# Patient Record
Sex: MALE | Race: WHITE | Employment: UNEMPLOYED | URBAN - METROPOLITAN AREA
[De-identification: names, ages, dates, MRNs, and addresses within clinical notes are randomized per-mention and may not be internally consistent; named-entity substitution may affect disease eponyms.]

---

## 2021-12-17 ENCOUNTER — HOSPITAL ENCOUNTER (EMERGENCY)
Age: 31
Discharge: HOME | End: 2021-12-18
Attending: STUDENT IN AN ORGANIZED HEALTH CARE EDUCATION/TRAINING PROGRAM
Payer: MEDICAID

## 2021-12-17 DIAGNOSIS — E87.6 HYPOKALEMIA: ICD-10-CM

## 2021-12-17 DIAGNOSIS — F19.10 POLYSUBSTANCE ABUSE (HCC): ICD-10-CM

## 2021-12-17 DIAGNOSIS — T40.601A OPIATE OVERDOSE, ACCIDENTAL OR UNINTENTIONAL, INITIAL ENCOUNTER (HCC): Primary | ICD-10-CM

## 2021-12-17 LAB
ANION GAP SERPL CALC-SCNC: 7 MMOL/L (ref 3–18)
BASOPHILS # BLD: 0 K/UL (ref 0–0.1)
BASOPHILS NFR BLD: 0 % (ref 0–2)
BUN SERPL-MCNC: 15 MG/DL (ref 7–18)
BUN/CREAT SERPL: 13 (ref 12–20)
CALCIUM SERPL-MCNC: 8.4 MG/DL (ref 8.5–10.1)
CHLORIDE SERPL-SCNC: 107 MMOL/L (ref 100–111)
CO2 SERPL-SCNC: 26 MMOL/L (ref 21–32)
CREAT SERPL-MCNC: 1.13 MG/DL (ref 0.6–1.3)
DIFFERENTIAL METHOD BLD: NORMAL
EOSINOPHIL # BLD: 0.2 K/UL (ref 0–0.4)
EOSINOPHIL NFR BLD: 3 % (ref 0–5)
ERYTHROCYTE [DISTWIDTH] IN BLOOD BY AUTOMATED COUNT: 13.5 % (ref 11.6–14.5)
GLUCOSE SERPL-MCNC: 185 MG/DL (ref 74–99)
HCT VFR BLD AUTO: 42.3 % (ref 36–48)
HGB BLD-MCNC: 14.6 G/DL (ref 13–16)
IMM GRANULOCYTES # BLD AUTO: 0 K/UL (ref 0–0.04)
IMM GRANULOCYTES NFR BLD AUTO: 0 % (ref 0–0.5)
LYMPHOCYTES # BLD: 3 K/UL (ref 0.9–3.6)
LYMPHOCYTES NFR BLD: 39 % (ref 21–52)
MAGNESIUM SERPL-MCNC: 1.9 MG/DL (ref 1.6–2.6)
MCH RBC QN AUTO: 31.2 PG (ref 24–34)
MCHC RBC AUTO-ENTMCNC: 34.5 G/DL (ref 31–37)
MCV RBC AUTO: 90.4 FL (ref 78–100)
MONOCYTES # BLD: 0.5 K/UL (ref 0.05–1.2)
MONOCYTES NFR BLD: 7 % (ref 3–10)
NEUTS SEG # BLD: 3.9 K/UL (ref 1.8–8)
NEUTS SEG NFR BLD: 51 % (ref 40–73)
NRBC # BLD: 0 K/UL (ref 0–0.01)
NRBC BLD-RTO: 0 PER 100 WBC
PLATELET # BLD AUTO: 212 K/UL (ref 135–420)
PMV BLD AUTO: 9.7 FL (ref 9.2–11.8)
POTASSIUM SERPL-SCNC: 3 MMOL/L (ref 3.5–5.5)
RBC # BLD AUTO: 4.68 M/UL (ref 4.35–5.65)
SODIUM SERPL-SCNC: 140 MMOL/L (ref 136–145)
WBC # BLD AUTO: 7.7 K/UL (ref 4.6–13.2)

## 2021-12-17 PROCEDURE — 99285 EMERGENCY DEPT VISIT HI MDM: CPT

## 2021-12-17 PROCEDURE — 74011250636 HC RX REV CODE- 250/636

## 2021-12-17 PROCEDURE — 74011250637 HC RX REV CODE- 250/637: Performed by: STUDENT IN AN ORGANIZED HEALTH CARE EDUCATION/TRAINING PROGRAM

## 2021-12-17 PROCEDURE — 80048 BASIC METABOLIC PNL TOTAL CA: CPT

## 2021-12-17 PROCEDURE — 96361 HYDRATE IV INFUSION ADD-ON: CPT

## 2021-12-17 PROCEDURE — 96374 THER/PROPH/DIAG INJ IV PUSH: CPT

## 2021-12-17 PROCEDURE — 83735 ASSAY OF MAGNESIUM: CPT

## 2021-12-17 PROCEDURE — 74011250636 HC RX REV CODE- 250/636: Performed by: STUDENT IN AN ORGANIZED HEALTH CARE EDUCATION/TRAINING PROGRAM

## 2021-12-17 PROCEDURE — 93005 ELECTROCARDIOGRAM TRACING: CPT

## 2021-12-17 PROCEDURE — 85025 COMPLETE CBC W/AUTO DIFF WBC: CPT

## 2021-12-17 RX ORDER — NALOXONE HYDROCHLORIDE 1 MG/ML
INJECTION INTRAMUSCULAR; INTRAVENOUS; SUBCUTANEOUS
Status: COMPLETED
Start: 2021-12-17 | End: 2021-12-17

## 2021-12-17 RX ORDER — NALOXONE HYDROCHLORIDE 1 MG/ML
2 INJECTION INTRAMUSCULAR; INTRAVENOUS; SUBCUTANEOUS ONCE
Status: COMPLETED | OUTPATIENT
Start: 2021-12-17 | End: 2021-12-17

## 2021-12-17 RX ORDER — POTASSIUM CHLORIDE 20 MEQ/1
40 TABLET, EXTENDED RELEASE ORAL
Status: COMPLETED | OUTPATIENT
Start: 2021-12-17 | End: 2021-12-17

## 2021-12-17 RX ADMIN — POTASSIUM CHLORIDE 40 MEQ: 1500 TABLET, EXTENDED RELEASE ORAL at 23:47

## 2021-12-17 RX ADMIN — NALOXONE HYDROCHLORIDE 2 MG: 1 INJECTION PARENTERAL at 22:42

## 2021-12-17 RX ADMIN — SODIUM CHLORIDE 1000 ML: 9 INJECTION, SOLUTION INTRAVENOUS at 23:00

## 2021-12-17 RX ADMIN — NALOXONE HYDROCHLORIDE 2 MG: 1 INJECTION INTRAMUSCULAR; INTRAVENOUS; SUBCUTANEOUS at 22:42

## 2021-12-18 VITALS
HEIGHT: 68 IN | WEIGHT: 140 LBS | DIASTOLIC BLOOD PRESSURE: 64 MMHG | HEART RATE: 69 BPM | BODY MASS INDEX: 21.22 KG/M2 | TEMPERATURE: 97 F | OXYGEN SATURATION: 95 % | SYSTOLIC BLOOD PRESSURE: 109 MMHG | RESPIRATION RATE: 11 BRPM

## 2021-12-18 RX ORDER — POTASSIUM CHLORIDE 20 MEQ/1
20 TABLET, EXTENDED RELEASE ORAL 3 TIMES DAILY
Qty: 9 TABLET | Refills: 0 | Status: SHIPPED | OUTPATIENT
Start: 2021-12-18 | End: 2021-12-21

## 2021-12-18 RX ORDER — NALOXONE HYDROCHLORIDE 4 MG/.1ML
SPRAY NASAL
Qty: 1 EACH | Refills: 0 | Status: SHIPPED | OUTPATIENT
Start: 2021-12-18

## 2021-12-18 NOTE — ED NOTES
Pt fell asleep, respiratory rate dropped and oxygen saturation dropped to mid 70's. Roused pt and sat quickly came up to 94%. Dr Branden Bello notified of event. Pt medicated as ordered.

## 2021-12-18 NOTE — ED NOTES
Report per EMS they were called for pt having sob. On their arrival pt cyanotic, snoring respirations with pulse ox of 40%. They began giving assisted breaths per ambu bag and gave him 2mg of Narcan. Pt became alert, pink and breathing on his own. On arrival pt alert, respirations unlabored and even. Pt admits to snorting an unknown substance.

## 2021-12-18 NOTE — DISCHARGE INSTRUCTIONS
Please carefully read all discharge instructions    Please follow-up with a primary care physician and if you do not have one currently use the contact information provided to obtain an appointment. If none was provided please call the number on the back of your insurance card to locate a Primary care doctor. Many offices have \"cancellation lists\" that you can ask to be placed on; should a patient with an earlier appointment cancel you will be notified to take their place. Please return to the Emergency Room immediately if your symptoms worsen. Please return to the Emergency Department if you develop a fever, chills, cannot eat or drink due to nausea or vomiting, or if any of your symptoms worsen. If you do not have insurance you can use the below for your medications. [email or web address]    What are GoodRx coupons? GoodRx coupons will help you pay less than the cash price for your prescription. Bettie Montano free to use and are accepted at virtually every [place] pharmacy. Your pharmacist will know how to enter the codes on the coupon to pull up the lowest discount available.

## 2021-12-19 LAB
ATRIAL RATE: 83 BPM
CALCULATED P AXIS, ECG09: 66 DEGREES
CALCULATED R AXIS, ECG10: 88 DEGREES
CALCULATED T AXIS, ECG11: 45 DEGREES
DIAGNOSIS, 93000: NORMAL
P-R INTERVAL, ECG05: 148 MS
Q-T INTERVAL, ECG07: 362 MS
QRS DURATION, ECG06: 112 MS
QTC CALCULATION (BEZET), ECG08: 425 MS
VENTRICULAR RATE, ECG03: 83 BPM

## 2021-12-19 NOTE — ED PROVIDER NOTES
EMERGENCY DEPARTMENT HISTORY AND PHYSICAL EXAM      Date: 12/17/2021  Patient Name: Chung Sims    History of Presenting Illness     Chief Complaint   Patient presents with    Shortness of Breath       History (Context): Chung Sims is a 27 y.o. male who presents with AMS by EMS. EMS was dispatched for a shortness of breath call, on arrival patient was cyanotic, snoring respirations, saturation of 40% initially they assisted ventilations with Ambu bag and gave him 2 mg of Narcan. Initially on arrival, patient was alert and oriented x4, admitted to inhalation of an unknown substance. A period of time after arrival he fell asleep, his oxygen saturation dropped to the mid 70s on room air, his respiration rate decreased, we arouse patient, he sat up and his O2 saturation came up to 94%. At this time I will give him prophylactic Narcan, observe. He stated that he normally smokes marijuana and some other inhaling drugs, vaping, but tonight he tried snorting heroin. PCP: None    Current Outpatient Medications   Medication Sig Dispense Refill    naloxone (Narcan) 4 mg/actuation nasal spray Use 1 spray intranasally, then discard. Repeat with new spray every 2 min as needed for opioid overdose symptoms, alternating nostrils. 1 Each 0    potassium chloride (K-DUR, KLOR-CON M20) 20 mEq tablet Take 1 Tablet by mouth three (3) times daily for 3 days. 9 Tablet 0       Past History     Past Medical History:  Past Medical History:   Diagnosis Date    Hydrocephalus     [facility]umatic [facility])        Past Surgical History:  Past Surgical History:   Procedure Laterality Date    HX ORTHOPAEDIC      right knee    NEUROLOGICAL PROCEDURE UNLISTED      surgery for water on the brain       Family History:  No family history on file.     Social History:  Social History     Tobacco Use    Smoking status: Current Every Day Smoker     Packs/day: 1.50    Smokeless tobacco: Not on file   Substance Use Topics  Alcohol use: Yes     Comment: socially    Drug use: Not on file       Allergies:  No Known Allergies    PMH, PSH, family history, social history, allergies reviewed with the patient with significant items noted above. Review of Systems   Could not be reliably obtained due to mental status    Physical Exam     Vitals:    12/18/21 0132 12/18/21 0202 12/18/21 0230 12/18/21 0302   BP: 104/62 110/64 121/71 109/64   Pulse: 72 73 73 69   Resp: 12 13 11 11   Temp:       SpO2: 96% 95% 97% 95%   Weight:       Height:           Gen:ill-appearing, chronic, in no acute distress   HEENT: Normocephalic, sclera anicteric  Cardiovascular: Normal rate, regular rhythm, no murmurs, rubs, gallops. Pulses intact and equal distally. Pulmonary: No respiratory distress. No stridor. Clear lungs. ABD: Soft, nontender, nondistended. Neuro: Alert. speech. Altered mentation. PERRLA. Cranial nerves II through XII intact. Sensation and strength normal  Psych: Flat, not agitated  : No CVA tenderness  EXT: No rashes. Moves all extremities well. No cyanosis or clubbing. Skin: Warm and well-perfused. Diagnostic Study Results     Labs -   No results found for this or any previous visit (from the past 12 hour(s)). Radiologic Studies -   No orders to display     CT Results  (Last 48 hours)    None        CXR Results  (Last 48 hours)    None            Medical Decision Making   I am the first provider for this patient. I reviewed the vital signs, available nursing notes, past medical history, past surgical history, family history and social history. Vital Signs-Reviewed the patient's vital signs. EKG: Interpreted by myself.  EKG non diagnostic, without acute ischemic changes, arrhythmia, prolonged QT, or evidence of Brugada       Records Reviewed: Personally, on initial evaluation    MDM:   Patient presents with altered mental status, alert on arrival.  Exam significant for admission heroin use, response to Narcan. DDX considered: The differential is broad but includes toxic, metabolic, infectious, primary neurologic, endocrine, functional etiologies. Will screen basic labs, EKG, suspect this is acute heroin overdose, now resolved with Narcan. Critical Care  Performed by: Aleena Hirsch MD  Authorized by: Aleena Hirsch MD     Critical care provider statement:     Critical care time (minutes):  35    Critical care was necessary to treat or prevent imminent or life-threatening deterioration of the following conditions:  CNS failure or compromise, respiratory failure and toxidrome    Critical care was time spent personally by me on the following activities:  Evaluation of patient's response to treatment, examination of patient, interpretation of cardiac output measurements, ordering and review of radiographic studies, ordering and review of laboratory studies, ordering and performing treatments and interventions, pulse oximetry, re-evaluation of patient's condition and review of old charts          Plan:   Close Observation  Cardiac monitoring    Orders as below:  Orders Placed This Encounter    CBC WITH AUTOMATED DIFF    BASIC METABOLIC PANEL    MAGNESIUM    EKG, 12 LEAD, INITIAL    naloxone (NARCAN) injection 2 mg    naloxone (NARCAN) 1 mg/mL injection    sodium chloride 0.9 % bolus infusion 1,000 mL    potassium chloride (K-DUR, KLOR-CON M20) SR tablet 40 mEq    naloxone (Narcan) 4 mg/actuation nasal spray    potassium chloride (K-DUR, KLOR-CON M20) 20 mEq tablet        ED Course:   ED Course as of 12/20/21 1834   Fri Dec 17, 2021   2319 CBC without leukocytosis or anemia. [DM]   8384 Potassium(!): 3.0  Repleting with oral 40   Checking mag [DM]   3503 Due to a high probability of clinically significant, life threatening deterioration, the patient required a significant level of medical attention. I personally spent this critical care time directly and personally managing the patient. This critical care time included obtaining a history; examining the patient; pulse oximetry; ordering and review of studies; arranging urgent treatment with development of a management plan; evaluation of patient's response to treatment; frequent reassessment; and, discussions with other providers. Critical care time was performed to assess and manage the high probability of imminent, life-threatening deterioration that could result in multi-organ failure. It was exclusive of separately billable procedures and treating other patients and teaching time. Total time 35 mins       [DM]      ED Course User Index  [DM] Nando Ferrara MD      Had a long discussion with patient about his polysubstance abuse, will prescribe him Narcan for home, explained that heroin, unlike marijuana or vaping, can certainly depress his respiratory drive, and kill him if no one was around to call 911. The patient had a prolonged stay for extensive diagnostics, interventions, observation, serial exams and treatment that were necessary for the diagnosis, treatment and safety of the patient due to their illness. No acute pathology necessitating further emergent workup or hospital admission is suspected or found. Will discharge home with narcan. He is comfortable with the plan and discharge at this time. Expressed the importance of follow up for current symptoms and he agrees and was advised on what signs/symptoms to return immediately to the ER.            Vitals Review/addressed -     Diagnostic Study Results     Orders Placed This Encounter    CBC WITH AUTOMATED DIFF     Standing Status:   Standing     Number of Occurrences:   1    BASIC METABOLIC PANEL     Standing Status:   Standing     Number of Occurrences:   1    MAGNESIUM     Standing Status:   Standing     Number of Occurrences:   1    EKG, 12 LEAD, INITIAL     Standing Status:   Standing     Number of Occurrences:   1     Order Specific Question:   Reason for Exam:     Answer:   sob    naloxone (NARCAN) injection 2 mg    naloxone (NARCAN) 1 mg/mL injection     Day, Ranci: cabinet override    sodium chloride 0.9 % bolus infusion 1,000 mL    potassium chloride (K-DUR, KLOR-CON M20) SR tablet 40 mEq    naloxone (Narcan) 4 mg/actuation nasal spray     Sig: Use 1 spray intranasally, then discard. Repeat with new spray every 2 min as needed for opioid overdose symptoms, alternating nostrils. Dispense:  1 Each     Refill:  0    potassium chloride (K-DUR, KLOR-CON M20) 20 mEq tablet     Sig: Take 1 Tablet by mouth three (3) times daily for 3 days. Dispense:  9 Tablet     Refill:  0       Labs -   No results found for this or any previous visit (from the past 12 hour(s)). Radiologic Studies -   No orders to display     CT Results  (Last 48 hours)    None        CXR Results  (Last 48 hours)    None          Disposition     Disposition:  Home    CLINICAL IMPRESSION:    1. Opiate overdose, accidental or unintentional, initial encounter ([facility]    2. Hypokalemia    3. Polysubstance abuse ([facility]        It should be noted that I will be the provider of record for this patient  Lucho Greco MD    Follow-up Information     Follow up With Specialties Details Why [address]    THE [facility] EMERGENCY DEPT Emergency Medicine Go to  If symptoms worsen [address] [id number]  [phone]          Discharge Medication List as of 12/18/2021  3:05 AM          Please note that this dictation was completed with [employer], the [employer] voice recognition software. Quite often unanticipated grammatical, syntax, homophones, and other interpretive errors are inadvertently transcribed by the computer software. Please disregard these errors. Please excuse any errors that have escaped final proofreading.

## 2023-05-24 RX ORDER — NALOXONE HYDROCHLORIDE 4 MG/.1ML
SPRAY NASAL
COMMUNITY
Start: 2021-12-18

## 2024-09-12 NOTE — ED NOTES
Pt given prescription with verbal medication information. Pt given verbal and written d/c instructions.  pt ambulated from ED in NAD abdominal pain